# Patient Record
Sex: MALE | Race: ASIAN | HISPANIC OR LATINO | URBAN - METROPOLITAN AREA
[De-identification: names, ages, dates, MRNs, and addresses within clinical notes are randomized per-mention and may not be internally consistent; named-entity substitution may affect disease eponyms.]

---

## 2019-05-28 ENCOUNTER — EMERGENCY (EMERGENCY)
Facility: HOSPITAL | Age: 34
LOS: 1 days | Discharge: HOME | End: 2019-05-28
Attending: EMERGENCY MEDICINE
Payer: COMMERCIAL

## 2019-05-28 VITALS
HEART RATE: 61 BPM | HEIGHT: 68 IN | WEIGHT: 164.91 LBS | SYSTOLIC BLOOD PRESSURE: 130 MMHG | DIASTOLIC BLOOD PRESSURE: 71 MMHG | RESPIRATION RATE: 19 BRPM | OXYGEN SATURATION: 98 % | TEMPERATURE: 98 F

## 2019-05-28 PROCEDURE — 99283 EMERGENCY DEPT VISIT LOW MDM: CPT

## 2019-05-28 RX ORDER — ACETAMINOPHEN 500 MG
975 TABLET ORAL ONCE
Refills: 0 | Status: COMPLETED | OUTPATIENT
Start: 2019-05-28 | End: 2019-05-28

## 2019-05-28 RX ORDER — IBUPROFEN 200 MG
600 TABLET ORAL ONCE
Refills: 0 | Status: COMPLETED | OUTPATIENT
Start: 2019-05-28 | End: 2019-05-28

## 2019-05-28 RX ADMIN — Medication 975 MILLIGRAM(S): at 13:34

## 2019-05-28 RX ADMIN — Medication 600 MILLIGRAM(S): at 13:35

## 2019-05-28 NOTE — ED PROVIDER NOTE - CLINICAL SUMMARY MEDICAL DECISION MAKING FREE TEXT BOX
Dr. Rodríguez Note: no indication for imaging or splint at this time, just pain meds, physical therapy, outpt.
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
## 2019-05-28 NOTE — ED PROVIDER NOTE - NSFOLLOWUPCLINICS_GEN_ALL_ED_FT
[facility] General Internal Medicine  General Internal Medicine  [address] [place] [address]  Phone: [phone]  Fax:   Follow Up Time: 7-10 Days    [facility]  Internal Medicine  [address] [place] [address]  Phone: [phone]  Fax: [phone]  Follow Up Time: 7-10 Days
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
## 2023-07-23 NOTE — ED ADULT TRIAGE NOTE - HEIGHT IN INCHES
· Presents from home due to worsening SOB, initially placed on BiPAP. Able to be weaned down to 6 L nasal cannula after breathing treatment, steroids and IV Lasix. · Chronically on 4 L nasal cannula  · Recent admission from 6/2 through 6/6 with COPD exacerbation. DC on prednisone taper. Patient reports compliance and had felt better up until about 2 weeks ago. · Called pulmonology on 7/14 due to SOB. Started on Z-Carlos and prednisone taper  · Finished both of these. Reports improvement initially but sob worsened after completion of steroids  · Home regimen: (Noncompliant with all nebulizers)  · Bevespi twice daily  · Budesonide twice daily  · Benralizumab monthly injection  · Montelukast  · Xopenex twice daily  · In the ER received IV Solu-Medrol, IV azithromycin and breathing treatments  · Plan:  · Respiratory protocol  · IV Solu-Medrol 40 mg every 8 hours  · Xopenex/Atrovent 3 times daily  · Pulmicort twice daily  · Performist twice daily  · Hold further doses of IV azithromycin. Patient recently on antibiotic. Afebrile.   Does not appear bacterial at this time  · Consult pulmonology 8

## 2024-02-07 ENCOUNTER — EMERGENCY (EMERGENCY)
Facility: HOSPITAL | Age: 39
LOS: 1 days | Discharge: HOME | End: 2024-02-07
Admitting: EMERGENCY MEDICINE
Payer: COMMERCIAL

## 2024-02-07 VITALS
RESPIRATION RATE: 16 BRPM | SYSTOLIC BLOOD PRESSURE: 122 MMHG | DIASTOLIC BLOOD PRESSURE: 74 MMHG | OXYGEN SATURATION: 96 % | TEMPERATURE: 98 F | HEART RATE: 73 BPM

## 2024-02-07 PROCEDURE — 99284 EMERGENCY DEPT VISIT MOD MDM: CPT

## 2024-02-07 RX ORDER — PSEUDOEPHEDRINE HCL 30 MG
30 TABLET ORAL ONCE
Refills: 0 | Status: COMPLETED | OUTPATIENT
Start: 2024-02-07 | End: 2024-02-07

## 2024-02-07 RX ORDER — IBUPROFEN 200 MG
600 TABLET ORAL ONCE
Refills: 0 | Status: COMPLETED | OUTPATIENT
Start: 2024-02-07 | End: 2024-02-07

## 2024-02-07 RX ADMIN — Medication 30 MILLIGRAM(S): at 04:52

## 2024-02-07 RX ADMIN — Medication 600 MILLIGRAM(S): at 04:37

## 2024-02-07 NOTE — ED PROVIDER NOTE - CLINICAL SUMMARY MEDICAL DECISION MAKING FREE TEXT BOX
39 y/o M with PMH of asthma presents c/o body aches, subjective fever, nasal congestion, and sore throat since last night. Patient likely has viral infection. Will send RVP and treat symptomatically with motrin and sudafed. Will reassess. 37 y/o M with PMH of asthma presents c/o body aches, subjective fever, nasal congestion, and sore throat since last night. Patient likely has viral infection. Will send RVP and treat symptomatically with motrin and sudafed. Will reassess.  Patient did not feel much better after treatment but was comfortable enough to go home.  Will discharge him and call him back if anything comes back positive from his rvp.  Patient agreeable to plan.  Patient comfortable and stable for discharge with pcp follow up.  Instructed to return to the ED immediately for any worsening symptoms or new concerns.    PLAN AND FOLLOW-UP: Patient counseled on all findings, diagnosis and treatment plan. Patient's questions and concerns addressed. Patient stable, discharged with instructions to follow up with PMD, and to return to ED at any time for worsening symptoms or any other concerns. Patient demonstrates understanding of the findings and the importance of appropriate follow up care.

## 2024-02-07 NOTE — ED PROVIDER NOTE - PATIENT PORTAL LINK FT
You can access the FollowMyHealth Patient Portal offered by [facility] by registering at the following website: [email or web address]. By joining [facility]’s FollowMyHealth portal, you will also be able to view your health information using other applications (apps) compatible with our system.

## 2024-02-07 NOTE — ED PROVIDER NOTE - OBJECTIVE STATEMENT
39 y/o M with PMH of asthma presents c/o body aches, subjective fever, nasal congestion, and sore throat since last night. Notes that he hasn't felt sick in over 15 years so when he started to feel bad yesterday he became concerned. Tried taking mucinex without relief. Recently traveled back to [place] from [place] this past Sunday. Denies any other complaints or concerns. Denies chest pain, SOB, cough, chills, abdominal pain, N/V/D/C, urinary complaints, HA, dizziness, numbness, tingling, weakness, trauma, injuries, falls, sick contacts.

## 2024-02-07 NOTE — ED PROVIDER NOTE - NSFOLLOWUPINSTRUCTIONS_ED_ALL_ED_FT
Upper Respiratory Infection, Adult  An upper respiratory infection (URI) is a common viral infection of the nose, throat, and upper air passages that lead to the lungs. The most common type of URI is the common cold. URIs usually get better on their own, without medical treatment.    What are the causes?  A URI is caused by a virus. You may catch a virus by:  Breathing in droplets from an infected person's cough or sneeze.  Touching something that has been exposed to the virus (is contaminated) and then touching your mouth, nose, or eyes.  What increases the risk?  You are more likely to get a URI if:  You are very young or very old.  You have close contact with others, such as at work, school, or a health care facility.  You smoke.  You have long-term (chronic) heart or lung disease.  You have a weakened disease-fighting system (immune system).  You have nasal allergies or asthma.  You are experiencing a lot of stress.  You have poor nutrition.  What are the signs or symptoms?  A URI usually involves some of the following symptoms:  Runny or stuffy (congested) nose.  Cough.  Sneezing.  Sore throat.  Headache.  Fatigue.  Fever.  Loss of appetite.  Pain in your forehead, behind your eyes, and over your cheekbones (sinus pain).  Muscle aches.  Redness or irritation of the eyes.  Pressure in the ears or face.  How is this diagnosed?  This condition may be diagnosed based on your medical history and symptoms, and a physical exam. Your health care provider may use a swab to take a mucus sample from your nose (nasal swab). This sample can be tested to determine what virus is causing the illness.    How is this treated?  URIs usually get better on their own within 7–10 days. Medicines cannot cure URIs, but your health care provider may recommend certain medicines to help relieve symptoms, such as:  Over-the-counter cold medicines.  Cough suppressants. Coughing is a type of defense against infection that helps to clear the respiratory system, so take these medicines only as recommended by your health care provider.  Fever-reducing medicines.  Follow these instructions at home:  Activity    Rest as needed.  If you have a fever, stay home from work or school until your fever is gone or until your health care provider says your URI cannot spread to other people (is no longer contagious). Your health care provider may have you wear a face mask to prevent your infection from spreading.  Relieving symptoms    Gargle with a mixture of salt and water 3–4 times a day or as needed. To make salt water, completely dissolve ½–1 tsp (3–6 g) of salt in 1 cup (237 mL) of warm water.  Use a cool-mist humidifier to add moisture to the air. This can help you breathe more easily.  Eating and drinking    A comparison of three sample cups showing dark yellow, yellow, and pale yellow urine.  Drink enough fluid to keep your urine pale yellow.  Eat soups and other clear broths.  General instructions    A sign showing that a person should not smoke.  Take over-the-counter and prescription medicines only as told by your health care provider. These include cold medicines, fever reducers, and cough suppressants.  Do not use any products that contain nicotine or tobacco. These products include cigarettes, chewing tobacco, and vaping devices, such as e-cigarettes. If you need help quitting, ask your health care provider.  Stay away from secondhand smoke.  Stay up to date on all immunizations, including the yearly (annual) flu vaccine.  Keep all follow-up visits. This is important.  How to prevent the spread of infection to others    Washing hands with soap and water.  URIs can be contagious. To prevent the infection from spreading:  Wash your hands with soap and water for at least 20 seconds. If soap and water are not available, use hand .  Avoid touching your mouth, face, eyes, or nose.  Cough or sneeze into a tissue or your sleeve or elbow instead of into your hand or into the air.  Contact a health care provider if:  You are getting worse instead of better.  You have a fever or chills.  Your mucus is brown or red.  You have yellow or brown discharge coming from your nose.  You have pain in your face, especially when you bend forward.  You have swollen neck glands.  You have pain while swallowing.  You have white areas in the back of your throat.  Get help right away if:  You have shortness of breath that gets worse.  You have severe or persistent:  Headache.  Ear pain.  Sinus pain.  Chest pain.  You have chronic lung disease along with any of the following:  Making high-pitched whistling sounds when you breathe, most often when you breathe out (wheezing).  Prolonged cough (more than 14 days).  Coughing up blood.  A change in your usual mucus.  You have a stiff neck.  You have changes in your:  Vision.  Hearing.  Thinking.  Mood.  These symptoms may be an emergency. Get help right away. Call 911.  Do not wait to see if the symptoms will go away.  Do not drive yourself to the hospital.    Summary  An upper respiratory infection (URI) is a common infection of the nose, throat, and upper air passages that lead to the lungs.  A URI is caused by a virus.  URIs usually get better on their own within 7–10 days.  Medicines cannot cure URIs, but your health care provider may recommend certain medicines to help relieve symptoms.  This information is not intended to replace advice given to you by your health care provider. Make sure you discuss any questions you have with your health care provider.

## 2024-02-07 NOTE — ED PROVIDER NOTE - NS_EDPROVIDERDISPOUSERTYPE_ED_A_ED
Outpatient Physical Therapy Ortho Treatment Note  [facility]     Patient Name: Shirley Colby  : 1963  MRN: [id number]  Today's Date: 3/15/2017      Visit Date: 03/15/2017    Visit Dx:    ICD-10-CM ICD-9-CM   1. Left hip pain M25.552 719.45   2. Rib pain on left side R07.81 786.50   3. Chronic bilateral low back pain without sciatica M54.5 724.2    G89.29 338.29       There is no problem list on file for this patient.       Past Medical History   Diagnosis Date   • Chronic back pain    • Diabetes mellitus    • GERD (gastroesophageal reflux disease)    • Hyperlipidemia    • Hypertension    • Seizures         Past Surgical History   Procedure Laterality Date   • Hysterectomy                               PT Assessment/Plan       03/15/17 0947       PT Assessment    Assessment Comments She is still reporting higher pain levels and having fear avoidance patterns. She is no complaint with what we have talked about her doing at home. Her cervical mobility slightly improved to the R today. She continues to have a very anterior pelvic tilt and stays in a hinger position in her mid-lower lumbar spine. She did turn her head to R during treatment while unsupported what appeared to be greater than 65 degrees (chin almost over shoulder without thoracic rotation). Today she reported the only thing that would help her neck is to go home, take a pain pill and go back to sleep. She is very deconditioned and needed rest break after walking about 250'. She did meet her tandem stance goal today.   -KR     PT Plan    PT Plan Comments Try to progress with activity as tolerate. Encourage increased activity at home. Go over her HEP again, progress, if appropriate. Today is her last scheduled appointment and I have encouraged her to schedule more the last couple visits. this is her 7th visit since her initial evaluation on .   -KR       User Key  (r) = Recorded By, (t) = Taken By, (c) = Cosigned By    Initials Name  "Provider Type    GASTON Chirinos, PT DPT Physical Therapist                    Exercises       03/15/17 0018          Subjective Comments    Subjective Comments She reports hurting on the L neck sick since 5 this morning, as if she hit her elbow. She reports not doing her walking or yoga. She reports making spaghetti last night, but her  had to help her empty the spaghetti and \"things she can't do\". She reports her  told her she moved all over the bed last night. She reports going to have eye April 4th and reports won't be able to bend her head down for a while.   -KR      Subjective Pain    Able to rate subjective pain? yes  -KR      Pre-Treatment Pain Level 7  -KR      Post-Treatment Pain Level 7  -KR      Subjective Pain Comment L neck/shoulder  -KR      Exercise 1    Exercise Name 1 sitting with towel roll behin dthoracic spine, arms unweighted cervical ROM, pain free  -KR      Reps 1 10  -KR      Exercise 2    Exercise Name 2 unweighted scapular retractions  -KR      Sets 2 2  -KR      Reps 2 10  -KR      Exercise 3    Exercise Name 3 UE phasic seated  -KR      Sets 3 2  -KR      Reps 3 10  -KR      Exercise 4    Exercise Name 4 seated hip flexion  -KR      Exercise 5    Exercise Name 5 ambulated one lap around hallway cue for scapular retraction/posterior pelvic tilt  -KR      Exercise 6    Exercise Name 6 standing tandem practice at [employer] machine for UE support, as needed.   -KR      Exercise 7    Exercise Name 7 seated hip abduction with green TB   -KR      Sets 7 3  -KR      Reps 7 10  -KR      Exercise 8    Exercise Name 8 B PNF UEs to prommoted thoracic extension  -KR      Reps 8 10  -KR      Exercise 9    Exercise Name 9 discussed and she verbalized sidelying HEP activities, and encouraged increased activity.   -KR        User Key  (r) = Recorded By, (t) = Taken By, (c) = Cosigned By    Initials Name Provider Type    GASTON Chirinos, PT DPT Physical Therapist                 "               PT OP Goals       03/15/17 0947       PT Short Term Goals    STG Date to Achieve 03/20/17  -KR     STG 1 Pt will report pain no greater than 4-5/10 with all daily activities.   -KR     STG 1 Progress Ongoing  -KR     STG 1 Progress Comments still reporting 7 or greater pain  -KR     STG 2 Pt will score 50% or less on Oswestry.   -KR     STG 2 Progress Ongoing  -KR     Long Term Goals    LTG Date to Achieve 04/03/17  -KR     LTG 1 Pt will demontrate cervical rotation 65 degrees or greater.   -KR     LTG 1 Progress Ongoing  -KR     LTG 1 Progress Comments 54 R 46 L   -KR     LTG 2 Pt will demonstrated throacic rotation 50% or greater.   -KR     LTG 2 Progress Ongoing  -KR     LTG 2 Progress Comments 25-50% today  -KR     LTG 3 Pt will perform tandem stance for 20 seconds or greater.   -KR     LTG 3 Progress Met  -KR     LTG 3 Progress Comments 20-25 seconds  -KR     LTG 4 Pt will report being able to complete hair hygene activities without increase in pain.   -KR     LTG 4 Progress Met  -KR     LTG 5 Pt will core 30% or less on Oswestry.   -KR     LTG 5 Progress Ongoing  -KR     Time Calculation    PT Goal Re-Cert Due Date 04/05/17  -KR       User Key  (r) = Recorded By, (t) = Taken By, (c) = Cosigned By    Initials Name Provider Type    GASTON Chirinos, PT DPT Physical Therapist                Therapy Education       03/15/17 0947          Therapy Education    Given HEP;Symptoms/condition management;Posture/body mechanics  -KR      Program Reinforced  -KR      How Provided Verbal  -KR      Provided to Patient  -KR      Level of Understanding Verbalized  -KR        User Key  (r) = Recorded By, (t) = Taken By, (c) = Cosigned By    Initials Name Provider Type    GASTON Chirinos, PT DPT Physical Therapist                Time Calculation:   Start Time: 0947  Stop Time: 1032  Time Calculation (min): 45 min  Total Timed Code Minutes- PT: 45 minute(s)    Therapy Charges for Today     Code  Description Service Date Service Provider Modifiers Qty    [id number] HC PT THER PROC EA 15 MIN 3/15/2017 Jessica Chirinos, PT DPT GP 3                    Jessica Chirinos, PT DPT  3/15/2017      I have personally evaluated and examined the patient. The Attending was available to me as a supervising provider if needed.

## 2024-02-07 NOTE — ED PROVIDER NOTE - PHYSICAL EXAMINATION
CONSTITUTIONAL: Comfortable; in no acute distress. Non-toxic appearing.   NEURO: Alert & oriented. Sensory and motor functions are grossly intact.  PSYCH: Mood appropriate. Thought processes intact.   HEAD: NCAT  CARD: Regular rate and rhythm, no murmurs  RESP: No accessory muscle use; breath sounds clear and equal bilaterally; no wheezes, rhonchi, or rales     ABD: Soft; non-distended; non-tender. No guarding or rebound.   MUSCULOSKELETAL/EXTREMITIES: FROM in all four extremities; no extremity edema.  SKIN: Warm; dry; no apparent lesions or exudate
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
## 2024-02-07 NOTE — ED ADULT NURSE NOTE - NSFALLUNIVINTERV_ED_ALL_ED
Bed/Stretcher in lowest position, wheels locked, appropriate side rails in place/Call bell, personal items and telephone in reach/Instruct patient to call for assistance before getting out of bed/chair/stretcher/Non-slip footwear applied when patient is off stretcher/[place] to call system/Physically safe environment - no spills, clutter or unnecessary equipment/Purposeful proactive rounding/Room/bathroom lighting operational, light cord in reach

## 2024-02-07 NOTE — ED POST DISCHARGE NOTE - REASON FOR FOLLOW-UP
Patient called for RVP results. Discussed with patient RVP Entero rhino virus. Discussed with patient supportive care. Discussed with patient need to return to ED if symptoms don't continue to improve or recur or develops any new or worsening symptoms that are of concern. Other

## 2024-06-19 PROBLEM — Z78.9 OTHER SPECIFIED HEALTH STATUS: Chronic | Status: ACTIVE | Noted: 2019-05-28
